# Patient Record
Sex: FEMALE | Race: WHITE | NOT HISPANIC OR LATINO | Employment: FULL TIME | URBAN - METROPOLITAN AREA
[De-identification: names, ages, dates, MRNs, and addresses within clinical notes are randomized per-mention and may not be internally consistent; named-entity substitution may affect disease eponyms.]

---

## 2024-12-11 ENCOUNTER — HOSPITAL ENCOUNTER (OUTPATIENT)
Dept: RADIOLOGY | Facility: CLINIC | Age: 54
Discharge: HOME | End: 2024-12-11
Payer: COMMERCIAL

## 2024-12-11 VITALS — HEIGHT: 67 IN | WEIGHT: 220.46 LBS | BODY MASS INDEX: 34.6 KG/M2

## 2024-12-11 DIAGNOSIS — Z12.31 SCREENING MAMMOGRAM FOR BREAST CANCER: ICD-10-CM

## 2024-12-11 PROCEDURE — 77063 BREAST TOMOSYNTHESIS BI: CPT

## 2024-12-11 PROCEDURE — 77067 SCR MAMMO BI INCL CAD: CPT | Performed by: RADIOLOGY

## 2024-12-11 PROCEDURE — 77063 BREAST TOMOSYNTHESIS BI: CPT | Performed by: RADIOLOGY

## 2025-02-08 ENCOUNTER — OFFICE VISIT (OUTPATIENT)
Dept: URGENT CARE | Age: 55
End: 2025-02-08
Payer: COMMERCIAL

## 2025-02-08 DIAGNOSIS — R05.1 ACUTE COUGH: ICD-10-CM

## 2025-02-08 DIAGNOSIS — J98.8 RESPIRATORY TRACT INFECTION: Primary | ICD-10-CM

## 2025-02-08 PROCEDURE — 99203 OFFICE O/P NEW LOW 30 MIN: CPT | Performed by: PHYSICIAN ASSISTANT

## 2025-02-08 RX ORDER — PREDNISONE 20 MG/1
20 TABLET ORAL DAILY
Qty: 10 TABLET | Refills: 0 | Status: SHIPPED | OUTPATIENT
Start: 2025-02-08 | End: 2025-02-18

## 2025-02-08 RX ORDER — AZITHROMYCIN 250 MG/1
TABLET, FILM COATED ORAL
Qty: 6 TABLET | Refills: 0 | Status: SHIPPED | OUTPATIENT
Start: 2025-02-08

## 2025-02-08 RX ORDER — BENZONATATE 200 MG/1
200 CAPSULE ORAL 3 TIMES DAILY PRN
Qty: 21 CAPSULE | Refills: 0 | Status: SHIPPED | OUTPATIENT
Start: 2025-02-08 | End: 2025-02-15

## 2025-02-08 NOTE — PROGRESS NOTES
Urgent Care Virtual Video Visit    Patient Location:  [place]  Provider Location: [facility]    Video visit completed with realtime synchronous video and or audio connection. Informed consent was obtained from the patient. Patient was made aware that my evaluation and diagnosis are limited due to the fact that we are not in the same room during the interview and that this is a virtual encounter that took place via audio and/or videoconferencing. Patient verbalized understanding.  I have communicated my name and active licensure.  The patient's identity and physical location were verified at the time of this visit.  Either the patient or their legal representative has been informed of the risks and benefits of and alternatives to treatment through a remote evaluation and consents to proceed with the evaluation remotely.      Subjective   Patient ID: Karlee Fernandes is a 54 y.o. female. They present today with a chief complaint of No chief complaint on file..    History of Present Illness:  This is a 54-year-old female presenting for a persistent cough, fatigue x 6 to 7 days.  No fever, chills, myalgias, abdominal pain, nausea, vomiting, diarrhea, rash.  No chest pain or shortness of breath.  No history of clots or clotting disorders.  No lower extremity swelling or pain.  History of Hashimoto's no other concerns or complaints.      Past Medical History  Allergies as of 02/08/2025    (No Known Allergies)     (Not in a hospital admission)    Past Medical History:   Diagnosis Date    Personal history of other diseases of the respiratory system     History of acute bronchitis    Personal history of other endocrine, nutritional and metabolic disease     History of Hashimoto thyroiditis    Personal history of pneumonia (recurrent)     History of pneumonia     Past Surgical History:   Procedure Laterality Date    OTHER SURGICAL HISTORY  12/30/2019    Cholecystectomy laparoscopic    OTHER SURGICAL HISTORY   05/28/2019    Knee surgery            Review of Systems  Review of Systems    After reviewing all body systems I have documented pertinent findings above in the history.  All other Systems reviewed and are negative for complaint.  Pertinent positive and negatives are listed in the above HPI.        Objective    There were no vitals filed for this visit.  No LMP recorded. (Menstrual status: IUD).    Physical Exam    Constitutional: Well-developed, well-nourished.  Alert.  Overall well-appearing.  Head and face: Normal  Mouth: No lip or tongue swelling.  No trismus.  Neck: Neck is supple.  Pulmonary: No respiratory distress.       MDM: MDM  An interactive audio and visual telecommunication system which permits real-time communication between the patient and provider was utilized to provide this telehealth service.    Patient presenting via telehealth for URI type symptoms.   No relief despite taking over-the-counter medications.      Patient does not appear toxic. No evidence of acute distress or respiratory compromise.  No tripoding. No nasal flaring.  Speaks in complete sentences.  Due to symptom onset/length, progression of worsening symptoms a bacterial URI is considered the most likely cause.  Through shared decision making the patient was prescribed antimicrobial therapy. Advised supportive therapy with over the counter medication and good follow up. Patient was instructed to follow-up with his PCP in the next 5 to 10 days if symptoms do not improve. Patient was advised returning to the emergency department or urgent care for an in person visit if any new or worsening symptoms.       Orders and Diagnoses  Diagnoses and all orders for this visit:  Respiratory tract infection  -     predniSONE (Deltasone) 20 mg tablet; Take 1 tablet (20 mg) by mouth once daily for 10 days.  -     azithromycin (Zithromax) 250 mg tablet; Take 2 tabs (500 mg) by mouth today, then take 1 tab daily for 4 days.  -     benzonatate  (Tessalon) 200 mg capsule; Take 1 capsule (200 mg) by mouth 3 times a day as needed for cough for up to 7 days. Do not crush or chew.  Acute cough  -     predniSONE (Deltasone) 20 mg tablet; Take 1 tablet (20 mg) by mouth once daily for 10 days.      Medical Admin Record      Patient disposition: Home    Electronically signed by [facility]gent [facility]  3:24 PM

## 2025-02-08 NOTE — PATIENT INSTRUCTIONS
You were seen for prolonged and worsening cold like symptoms.  You most likely have a bacterial respiratory tract infection.  I recommend supportive therapy with the following medications below.    URI  1.  Please take *azithromycin* as prescribed  2.  Use Tea and honey for cough. This is known to work better than most OTC medications.  3.  Use pseudoephedrine Bromphen as prescribed  4.  Stay well-hydrated and drink lots of fluids.  5.  Follow up with your primary care physician in the next 5 to 10 days if symptoms do not improve.  6.  Return to ED care if symptoms worsen or new symptoms develop.    Based on clinical exam findings and history you most likely have a upper respiratory tract infection.  Your initial illness was likely caused by a virus that progressed to a secondary bacterial infection.  You are contagious as soon as the symptoms start.  Your symptoms may persist up to 2-3 weeks.  Symptoms usually peak by days 3 or 5.  Typical symptoms include nasal congestion, a runny nose, scratchy throat, cough, and irritability. The diagnosis is based on symptoms. Good hand hygiene is the best way to prevent these infections, and routine vaccination can help prevent influenza. Treatment aims to relieve symptoms. Rest and fluids. Tylenol and/or ibuprofen for fever and pain. Over the counter decongestants or mucolytics.